# Patient Record
Sex: FEMALE | Race: WHITE
[De-identification: names, ages, dates, MRNs, and addresses within clinical notes are randomized per-mention and may not be internally consistent; named-entity substitution may affect disease eponyms.]

---

## 2019-02-16 ENCOUNTER — HOSPITAL ENCOUNTER (EMERGENCY)
Dept: HOSPITAL 63 - ER | Age: 44
Discharge: HOME | End: 2019-02-16
Payer: COMMERCIAL

## 2019-02-16 VITALS — DIASTOLIC BLOOD PRESSURE: 86 MMHG | SYSTOLIC BLOOD PRESSURE: 133 MMHG

## 2019-02-16 VITALS — HEIGHT: 64 IN | WEIGHT: 178 LBS | BODY MASS INDEX: 30.39 KG/M2

## 2019-02-16 DIAGNOSIS — Z88.8: ICD-10-CM

## 2019-02-16 DIAGNOSIS — R11.11: ICD-10-CM
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
## 2019-02-16 DIAGNOSIS — S60.031A: Primary | ICD-10-CM
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
## 2019-02-16 DIAGNOSIS — S60.041A: ICD-10-CM

## 2019-02-16 PROCEDURE — 73130 X-RAY EXAM OF HAND: CPT

## 2019-02-16 PROCEDURE — 99283 EMERGENCY DEPT VISIT LOW MDM: CPT
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
## 2020-08-21 ENCOUNTER — HOSPITAL ENCOUNTER (EMERGENCY)
Dept: HOSPITAL 63 - ER | Age: 45
Discharge: HOME | End: 2020-08-21
Payer: COMMERCIAL

## 2020-08-21 VITALS — SYSTOLIC BLOOD PRESSURE: 130 MMHG | DIASTOLIC BLOOD PRESSURE: 98 MMHG

## 2020-08-21 VITALS — WEIGHT: 181.44 LBS | BODY MASS INDEX: 29.87 KG/M2 | HEIGHT: 65.5 IN
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
## 2020-08-21 DIAGNOSIS — M25.511: Primary | ICD-10-CM

## 2020-08-21 DIAGNOSIS — Z88.8: ICD-10-CM
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
## 2020-08-21 PROCEDURE — 99283 EMERGENCY DEPT VISIT LOW MDM: CPT

## 2020-08-21 PROCEDURE — 73000 X-RAY EXAM OF COLLAR BONE: CPT
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
## 2020-08-24 ENCOUNTER — HOSPITAL ENCOUNTER (EMERGENCY)
Dept: HOSPITAL 63 - ER | Age: 45
Discharge: HOME | End: 2020-08-24
Payer: COMMERCIAL

## 2020-08-24 VITALS — HEIGHT: 65.5 IN | WEIGHT: 181.44 LBS | BODY MASS INDEX: 29.87 KG/M2

## 2020-08-24 VITALS
DIASTOLIC BLOOD PRESSURE: 95 MMHG | SYSTOLIC BLOOD PRESSURE: 133 MMHG | SYSTOLIC BLOOD PRESSURE: 133 MMHG | DIASTOLIC BLOOD PRESSURE: 95 MMHG
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
## 2020-08-24 DIAGNOSIS — R20.2: Primary | ICD-10-CM
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
## 2020-08-24 DIAGNOSIS — Z88.8: ICD-10-CM

## 2020-08-24 PROCEDURE — 70498 CT ANGIOGRAPHY NECK: CPT

## 2020-08-24 PROCEDURE — 81025 URINE PREGNANCY TEST: CPT

## 2020-08-24 PROCEDURE — 99285 EMERGENCY DEPT VISIT HI MDM: CPT

## 2020-08-24 PROCEDURE — 70496 CT ANGIOGRAPHY HEAD: CPT
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
## 2020-08-27 ENCOUNTER — HOSPITAL ENCOUNTER (OUTPATIENT)
Dept: HOSPITAL 61 - US | Age: 45
End: 2020-08-27
Payer: COMMERCIAL

## 2020-08-27 DIAGNOSIS — M25.511: Primary | ICD-10-CM

## 2020-08-27 PROCEDURE — 93971 EXTREMITY STUDY: CPT

## 2020-08-27 PROCEDURE — 93931 UPPER EXTREMITY STUDY: CPT
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
## 2020-09-30 ENCOUNTER — HOSPITAL ENCOUNTER (OUTPATIENT)
Dept: HOSPITAL 63 - DXRAD | Age: 45
Discharge: HOME | End: 2020-09-30
Attending: PHYSICIAN ASSISTANT
Payer: COMMERCIAL

## 2020-09-30 DIAGNOSIS — M47.22: Primary | ICD-10-CM

## 2020-09-30 DIAGNOSIS — M25.511: ICD-10-CM

## 2020-09-30 DIAGNOSIS — M25.78: ICD-10-CM

## 2020-09-30 DIAGNOSIS — M48.02: ICD-10-CM

## 2020-09-30 PROCEDURE — 72050 X-RAY EXAM NECK SPINE 4/5VWS: CPT

## 2020-09-30 PROCEDURE — 73030 X-RAY EXAM OF SHOULDER: CPT

## 2020-09-30 NOTE — RAD
PROCEDURE: CERVICAL SPINE 5V, SHOULDER 2+V RIGHT

 

STUDY DATE: 9/30/2020

 

CLINICAL INDICATION / HISTORY: Reason: CERVICALGIA / Spl. Instructions:  /

History: .

 

TECHNIQUE: 5 VIEWS: AP, lateral, bilateral oblique and odontoid 

 

COMPARISON: None available

 

FINDINGS: Alignment is within normal limits. There is preservation of the 

normal cervical lordosis. Vertebral body heights and disc spaces are well 

maintained. Bilateral oblique views show minimal endplate osteophytic 

spurring resulting in mild bilateral C5-C6 foraminal narrowing. The 

atlantoaxial joint is well maintained. No fracture or subluxation is 

identified. Prevertebral and paraspinous soft tissues are unremarkable.

 

IMPRESSION: Minimal early degenerative changes at C5-C6 with mild 

bilateral foraminal narrowing. Otherwise unremarkable C-spine series with 

no evidence of fracture or subluxation in the cervical spine.

 

 

 

PROCEDURE: CERVICAL SPINE 5V, SHOULDER 2+V RIGHT

 

STUDY DATE: 9/30/2020

 

CLINICAL INDICATION / HISTORY: Reason: CERVICALGIA / Spl. Instructions:  /

History: .

 

TECHNIQUE: Grashey and axillary views with a Y- view were obtained.

 

COMPARISON: None

 

FINDINGS:  No fracture, dislocation or bone destruction is identified. 

There are no degenerative changes at the right AC joint. No calcifications

are seen in relation to the rotator cuff insertion. 

 

IMPRESSION:  No acute osseous abnormality.

 

Electronically signed by: Satish Nicole MD (9/30/2020 10:36 AM) 

[id number]
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
